# Patient Record
Sex: MALE | Race: WHITE | NOT HISPANIC OR LATINO | Employment: OTHER | URBAN - METROPOLITAN AREA
[De-identification: names, ages, dates, MRNs, and addresses within clinical notes are randomized per-mention and may not be internally consistent; named-entity substitution may affect disease eponyms.]

---

## 2022-04-19 ENCOUNTER — PRE-ADMISSION TESTING (OUTPATIENT)
Dept: ADMISSIONS | Facility: MEDICAL CENTER | Age: 67
End: 2022-04-19
Attending: INTERNAL MEDICINE
Payer: MEDICARE

## 2022-04-19 RX ORDER — FUROSEMIDE 20 MG/1
20 TABLET ORAL 2 TIMES DAILY
COMMUNITY
Start: 2022-01-27 | End: 2022-04-27

## 2022-04-19 RX ORDER — PSEUDOEPHEDRINE HCL 30 MG
30 TABLET ORAL EVERY MORNING
COMMUNITY

## 2022-04-19 RX ORDER — TRAZODONE HYDROCHLORIDE 50 MG/1
50 TABLET ORAL NIGHTLY PRN
COMMUNITY
End: 2023-01-16

## 2022-04-19 RX ORDER — HYDROCHLOROTHIAZIDE 12.5 MG/1
12.5 TABLET ORAL EVERY MORNING
COMMUNITY
Start: 2022-01-27 | End: 2023-02-13 | Stop reason: SDUPTHER

## 2022-04-19 RX ORDER — PROMETHAZINE HYDROCHLORIDE 25 MG/1
25 TABLET ORAL
COMMUNITY
Start: 2022-03-28 | End: 2022-06-25

## 2022-04-19 RX ORDER — MELATONIN 10 MG
CAPSULE ORAL NIGHTLY
COMMUNITY

## 2022-04-19 NOTE — OR NURSING
Pre admit telephone call completed with patient and wife. Based on patient history, he doesn't met criteria for bone marrow biopsy to be done under moderate sedation. Spoke with Lisa,  at [facility], she will send new orders for general anesthesia per guideline.

## 2022-04-22 ENCOUNTER — ANESTHESIA EVENT (OUTPATIENT)
Dept: SURGERY | Facility: MEDICAL CENTER | Age: 67
End: 2022-04-22
Payer: MEDICARE

## 2022-04-22 ENCOUNTER — HOSPITAL ENCOUNTER (OUTPATIENT)
Facility: MEDICAL CENTER | Age: 67
End: 2022-04-22
Attending: INTERNAL MEDICINE | Admitting: INTERNAL MEDICINE
Payer: MEDICARE

## 2022-04-22 ENCOUNTER — ANESTHESIA (OUTPATIENT)
Dept: SURGERY | Facility: MEDICAL CENTER | Age: 67
End: 2022-04-22
Payer: MEDICARE

## 2022-04-22 VITALS
BODY MASS INDEX: 44.83 KG/M2 | RESPIRATION RATE: 17 BRPM | DIASTOLIC BLOOD PRESSURE: 62 MMHG | OXYGEN SATURATION: 98 % | HEART RATE: 80 BPM | SYSTOLIC BLOOD PRESSURE: 111 MMHG | WEIGHT: 243.61 LBS | HEIGHT: 62 IN | TEMPERATURE: 98 F

## 2022-04-22 DIAGNOSIS — D89.2 PARAPROTEINEMIA: ICD-10-CM

## 2022-04-22 LAB
ANION GAP SERPL CALC-SCNC: 9 MMOL/L (ref 7–16)
BASOPHILS # BLD AUTO: 0.5 % (ref 0–1.8)
BASOPHILS # BLD: 0.07 K/UL (ref 0–0.12)
BUN SERPL-MCNC: 23 MG/DL (ref 8–22)
CALCIUM SERPL-MCNC: 9.5 MG/DL (ref 8.5–10.5)
CHLORIDE SERPL-SCNC: 93 MMOL/L (ref 96–112)
CO2 SERPL-SCNC: 30 MMOL/L (ref 20–33)
CREAT SERPL-MCNC: 0.65 MG/DL (ref 0.5–1.4)
EKG IMPRESSION: NORMAL
EOSINOPHIL # BLD AUTO: 0.15 K/UL (ref 0–0.51)
EOSINOPHIL NFR BLD: 1.1 % (ref 0–6.9)
ERYTHROCYTE [DISTWIDTH] IN BLOOD BY AUTOMATED COUNT: 48.2 FL (ref 35.9–50)
GFR SERPLBLD CREATININE-BSD FMLA CKD-EPI: 102 ML/MIN/1.73 M 2
GLUCOSE SERPL-MCNC: 119 MG/DL (ref 65–99)
HCT VFR BLD AUTO: 41.6 % (ref 42–52)
HGB BLD-MCNC: 13.3 G/DL (ref 14–18)
HGB RETIC QN AUTO: 31.2 PG/CELL (ref 29–35)
IMM GRANULOCYTES # BLD AUTO: 0.1 K/UL (ref 0–0.11)
IMM GRANULOCYTES NFR BLD AUTO: 0.7 % (ref 0–0.9)
IMM RETICS NFR: 19.2 % (ref 9.3–17.4)
LYMPHOCYTES # BLD AUTO: 2.62 K/UL (ref 1–4.8)
LYMPHOCYTES NFR BLD: 19.2 % (ref 22–41)
MCH RBC QN AUTO: 26.7 PG (ref 27–33)
MCHC RBC AUTO-ENTMCNC: 32 G/DL (ref 33.7–35.3)
MCV RBC AUTO: 83.5 FL (ref 81.4–97.8)
MONOCYTES # BLD AUTO: 1.22 K/UL (ref 0–0.85)
MONOCYTES NFR BLD AUTO: 9 % (ref 0–13.4)
NEUTROPHILS # BLD AUTO: 9.46 K/UL (ref 1.82–7.42)
NEUTROPHILS NFR BLD: 69.5 % (ref 44–72)
NRBC # BLD AUTO: 0 K/UL
NRBC BLD-RTO: 0 /100 WBC
PATHOLOGY CONSULT NOTE: NORMAL
PLATELET # BLD AUTO: 333 K/UL (ref 164–446)
PMV BLD AUTO: 8.6 FL (ref 9–12.9)
POTASSIUM SERPL-SCNC: 4.2 MMOL/L (ref 3.6–5.5)
RBC # BLD AUTO: 4.98 M/UL (ref 4.7–6.1)
RETICS # AUTO: 0.07 M/UL (ref 0.04–0.06)
RETICS/RBC NFR: 1.5 % (ref 0.8–2.1)
SODIUM SERPL-SCNC: 132 MMOL/L (ref 135–145)
WBC # BLD AUTO: 13.6 K/UL (ref 4.8–10.8)

## 2022-04-22 PROCEDURE — 93005 ELECTROCARDIOGRAM TRACING: CPT | Performed by: INTERNAL MEDICINE

## 2022-04-22 PROCEDURE — 80048 BASIC METABOLIC PNL TOTAL CA: CPT

## 2022-04-22 PROCEDURE — 160035 HCHG PACU - 1ST 60 MINS PHASE I: Performed by: HOSPITALIST

## 2022-04-22 PROCEDURE — 88237 TISSUE CULTURE BONE MARROW: CPT | Mod: 91

## 2022-04-22 PROCEDURE — 88305 TISSUE EXAM BY PATHOLOGIST: CPT | Mod: 59

## 2022-04-22 PROCEDURE — 88374 M/PHMTRC ALYS ISHQUANT/SEMIQ: CPT | Mod: 91

## 2022-04-22 PROCEDURE — 85025 COMPLETE CBC W/AUTO DIFF WBC: CPT

## 2022-04-22 PROCEDURE — 88311 DECALCIFY TISSUE: CPT

## 2022-04-22 PROCEDURE — 700105 HCHG RX REV CODE 258: Performed by: INTERNAL MEDICINE

## 2022-04-22 PROCEDURE — 160025 RECOVERY II MINUTES (STATS): Performed by: HOSPITALIST

## 2022-04-22 PROCEDURE — 88184 FLOWCYTOMETRY/ TC 1 MARKER: CPT

## 2022-04-22 PROCEDURE — 93010 ELECTROCARDIOGRAM REPORT: CPT | Performed by: INTERNAL MEDICINE

## 2022-04-22 PROCEDURE — 85046 RETICYTE/HGB CONCENTRATE: CPT

## 2022-04-22 PROCEDURE — 88264 CHROMOSOME ANALYSIS 20-25: CPT

## 2022-04-22 PROCEDURE — 88185 FLOWCYTOMETRY/TC ADD-ON: CPT | Mod: 91

## 2022-04-22 PROCEDURE — 88342 IMHCHEM/IMCYTCHM 1ST ANTB: CPT

## 2022-04-22 PROCEDURE — 160002 HCHG RECOVERY MINUTES (STAT): Performed by: HOSPITALIST

## 2022-04-22 PROCEDURE — 160046 HCHG PACU - 1ST 60 MINS PHASE II: Performed by: HOSPITALIST

## 2022-04-22 PROCEDURE — 01112 ANES BONE MARROW ASPIR&/BX: CPT | Performed by: ANESTHESIOLOGY

## 2022-04-22 PROCEDURE — 160048 HCHG OR STATISTICAL LEVEL 1-5: Performed by: HOSPITALIST

## 2022-04-22 PROCEDURE — 36415 COLL VENOUS BLD VENIPUNCTURE: CPT

## 2022-04-22 PROCEDURE — 38222 DX BONE MARROW BX & ASPIR: CPT | Performed by: HOSPITALIST

## 2022-04-22 PROCEDURE — 88360 TUMOR IMMUNOHISTOCHEM/MANUAL: CPT

## 2022-04-22 PROCEDURE — 700101 HCHG RX REV CODE 250: Performed by: ANESTHESIOLOGY

## 2022-04-22 PROCEDURE — 88341 IMHCHEM/IMCYTCHM EA ADD ANTB: CPT | Mod: 91

## 2022-04-22 PROCEDURE — 700111 HCHG RX REV CODE 636 W/ 250 OVERRIDE (IP): Performed by: ANESTHESIOLOGY

## 2022-04-22 PROCEDURE — 88313 SPECIAL STAINS GROUP 2: CPT

## 2022-04-22 PROCEDURE — 160009 HCHG ANES TIME/MIN: Performed by: HOSPITALIST

## 2022-04-22 PROCEDURE — 160027 HCHG SURGERY MINUTES - 1ST 30 MINS LEVEL 2: Performed by: HOSPITALIST

## 2022-04-22 RX ORDER — KETAMINE HYDROCHLORIDE 50 MG/ML
INJECTION, SOLUTION INTRAMUSCULAR; INTRAVENOUS PRN
Status: DISCONTINUED | OUTPATIENT
Start: 2022-04-22 | End: 2022-04-22 | Stop reason: SURG

## 2022-04-22 RX ORDER — SODIUM CHLORIDE, SODIUM LACTATE, POTASSIUM CHLORIDE, CALCIUM CHLORIDE 600; 310; 30; 20 MG/100ML; MG/100ML; MG/100ML; MG/100ML
INJECTION, SOLUTION INTRAVENOUS CONTINUOUS
Status: DISCONTINUED | OUTPATIENT
Start: 2022-04-22 | End: 2022-04-22 | Stop reason: HOSPADM

## 2022-04-22 RX ORDER — MIDAZOLAM HYDROCHLORIDE 1 MG/ML
INJECTION INTRAMUSCULAR; INTRAVENOUS PRN
Status: DISCONTINUED | OUTPATIENT
Start: 2022-04-22 | End: 2022-04-22 | Stop reason: SURG

## 2022-04-22 RX ORDER — DIPHENHYDRAMINE HYDROCHLORIDE 50 MG/ML
12.5 INJECTION INTRAMUSCULAR; INTRAVENOUS
Status: DISCONTINUED | OUTPATIENT
Start: 2022-04-22 | End: 2022-04-22 | Stop reason: HOSPADM

## 2022-04-22 RX ORDER — ONDANSETRON 2 MG/ML
4 INJECTION INTRAMUSCULAR; INTRAVENOUS
Status: DISCONTINUED | OUTPATIENT
Start: 2022-04-22 | End: 2022-04-22 | Stop reason: HOSPADM

## 2022-04-22 RX ORDER — HALOPERIDOL 5 MG/ML
1 INJECTION INTRAMUSCULAR
Status: DISCONTINUED | OUTPATIENT
Start: 2022-04-22 | End: 2022-04-22 | Stop reason: HOSPADM

## 2022-04-22 RX ADMIN — KETAMINE HYDROCHLORIDE 20 MG: 50 INJECTION INTRAMUSCULAR; INTRAVENOUS at 11:05

## 2022-04-22 RX ADMIN — KETAMINE HYDROCHLORIDE 20 MG: 50 INJECTION INTRAMUSCULAR; INTRAVENOUS at 11:16

## 2022-04-22 RX ADMIN — KETAMINE HYDROCHLORIDE 10 MG: 50 INJECTION INTRAMUSCULAR; INTRAVENOUS at 11:12

## 2022-04-22 RX ADMIN — MIDAZOLAM HYDROCHLORIDE 2 MG: 1 INJECTION, SOLUTION INTRAMUSCULAR; INTRAVENOUS at 11:05

## 2022-04-22 RX ADMIN — SODIUM CHLORIDE, POTASSIUM CHLORIDE, SODIUM LACTATE AND CALCIUM CHLORIDE: 600; 310; 30; 20 INJECTION, SOLUTION INTRAVENOUS at 10:59

## 2022-04-22 RX ADMIN — KETAMINE HYDROCHLORIDE 20 MG: 50 INJECTION INTRAMUSCULAR; INTRAVENOUS at 11:08

## 2022-04-22 ASSESSMENT — FIBROSIS 4 INDEX: FIB4 SCORE: 0.57

## 2022-04-22 ASSESSMENT — PAIN DESCRIPTION - PAIN TYPE: TYPE: SURGICAL PAIN

## 2022-04-22 NOTE — ANESTHESIA TIME REPORT
Anesthesia Start and Stop Event Times     Date Time Event    4/22/2022 1050 Ready for Procedure     1059 Anesthesia Start     1126 Anesthesia Stop        Responsible Staff  04/22/22    Name Role Begin End    Austyn Azul M.D. Anesth 1059 1126        Overtime Reason:  no overtime (within assigned shift)    Comments:

## 2022-04-22 NOTE — PROCEDURES
Bone Marrow Biopsy/Aspiration    Date/Time: 4/22/2022 12:04 PM  Performed by: Floyd Javier M.D.  Authorized by: Floyd Javier M.D.     Consent:     Consent obtained:  Verbal    Consent given by:  Patient    Risks discussed:  Bleeding, infection, pain and repeat procedure    Alternatives discussed:  No treatment, delayed treatment and alternative treatment  Pre-procedure details:     Procedure type:  Aspiration and biopsy    Requesting physician:  Sujey    Indications:  Multiple myeloma    Position:  Left lateral decubitus    Buttock laterality:  Right and left    Local anesthetic:  1% Lidocaine    Subcutaneous volume:  1 mL    Periosteum anesthetic volume:  8 mL    Preparation: Patient was prepped and draped in usual sterile fashion    Sedation:     Patient Sedated: No    Procedure details:     Aspirate obtained:  5 mL followed by 5 mL    Biopsy performed:  1 core    Number of attempts:  1  Post-procedure:     Puncture site:  Adhesive bandage applied    Patient tolerance of procedure:  Tolerated well, no immediate complications  Comments:      Anesthesia provided by Dr. Azul  Patient with left sided pain pump, he was positioned in left lateral decubitus position and right hip was biopsied  Spinal needle required to anesthesize periosteum  Heparinized aspirate on was obtained after core

## 2022-04-22 NOTE — ANESTHESIA POSTPROCEDURE EVALUATION
Patient: Kenneth Wasserman    Procedure Summary     Date: 04/22/22 Room / Location: [facility] ROOM 26 / SURGERY SAME DAY [facility]    Anesthesia Start: 1059 Anesthesia Stop: 1126    Procedures:       ASPIRATION, BONE MARROW (Right Hip)      BIOPSY, BONE MARROW, USING NEEDLE OR TROCAR - DR. WHATLEY (Right Hip) Diagnosis: (NONOCLONAL GAMMOPATHY)    Surgeons: Floyd Javier M.D. Responsible Provider: Austyn Azul M.D.    Anesthesia Type: MAC ASA Status: 3          Final Anesthesia Type: MAC  Last vitals  BP   Blood Pressure : 148/69    Temp   36.3 °C (97.4 °F)    Pulse   96   Resp   18    SpO2   96 %      Anesthesia Post Evaluation    Patient location during evaluation: PACU  Patient participation: complete - patient participated  Level of consciousness: awake and alert    Airway patency: patent  Anesthetic complications: no  Cardiovascular status: hemodynamically stable  Respiratory status: acceptable  Hydration status: euvolemic    PONV: none          No complications documented.     Nurse Pain Score: 0 (NPRS)

## 2022-04-22 NOTE — ANESTHESIA PREPROCEDURE EVALUATION
Case: [id number] Date/Time: 04/22/22 1115    Procedures:       ASPIRATION, BONE MARROW      BIOPSY, BONE MARROW, USING NEEDLE OR TROCAR - DR. WHATLEY    Pre-op diagnosis: NONOCLONAL GAMMOPATHY    Location: [facility] ROOM 26 / SURGERY SAME DAY [facility]    Surgeons: Floyd Javier M.D.          Relevant Problems   PULMONARY   (positive) COPD (chronic obstructive pulmonary disease) (HCC)      CARDIAC   (positive) Hypertension      GI   (positive) GERD (gastroesophageal reflux disease)      Other   (positive) BMI 40.0-44.9, adult (HCC)   (positive) Chronic pain   (positive) Elevated fasting glucose   (positive) Marijuana smoker   (positive) Narcotic dependence ([facility])   (positive) Snoring       Physical Exam    Airway   Mallampati: III  TM distance: >3 FB  Neck ROM: full       Cardiovascular - normal exam  Rhythm: regular  Rate: normal  (-) murmur     Dental - normal exam           Pulmonary - normal exam  Breath sounds clear to auscultation     Abdominal    Neurological - normal exam                 Anesthesia Plan    ASA 3   ASA physical status 3 criteria: morbid obesity - BMI greater than or equal to 40    Plan - MAC               Induction: intravenous          Informed Consent:    Anesthetic plan and risks discussed with patient.

## 2022-04-22 NOTE — OR NURSING
1123- Pt arrived to PACU, report received.  Pt on 2L NC.  R hip with bandaid on that's CDI.      1130- Phase 2 met at this time. Pt tolerating sips of water.     1205-Pt getting dressed.      1212-IV d.c with tip intact.  Discharge instructions discussed with patient and his wife.  All questions answered at this time.  Pt d/c home via wheelchair with all belongings.

## 2022-11-08 ENCOUNTER — PATIENT MESSAGE (OUTPATIENT)
Dept: HEALTH INFORMATION MANAGEMENT | Facility: OTHER | Age: 67
End: 2022-11-08

## 2023-01-16 PROBLEM — M96.1 POSTLAMINECTOMY SYNDROME, NOT ELSEWHERE CLASSIFIED: Status: ACTIVE | Noted: 2018-10-18

## 2023-01-16 PROBLEM — M81.0 AGE-RELATED OSTEOPOROSIS WITHOUT CURRENT PATHOLOGICAL FRACTURE: Status: ACTIVE | Noted: 2021-07-06

## 2023-01-16 PROBLEM — F41.9 ANXIETY: Status: ACTIVE | Noted: 2023-01-16

## 2023-01-16 PROBLEM — K57.32 DIVERTICULITIS OF COLON: Status: ACTIVE | Noted: 2020-12-01

## 2023-01-16 PROBLEM — F32.A DEPRESSION: Status: ACTIVE | Noted: 2020-12-01

## 2023-01-16 PROBLEM — E29.1 HYPOGONADISM IN MALE: Status: ACTIVE | Noted: 2020-12-01

## 2023-01-16 PROBLEM — E24.0 CUSHING DISEASE (HCC): Status: ACTIVE | Noted: 2021-11-15

## 2023-06-12 PROBLEM — M19.90 OSTEOARTHROSIS: Status: ACTIVE | Noted: 2023-06-12

## 2023-06-12 PROBLEM — M51.36 OTHER INTERVERTEBRAL DISC DEGENERATION, LUMBAR REGION: Status: ACTIVE | Noted: 2018-10-18

## 2023-06-12 PROBLEM — R79.89 LOW TESTOSTERONE LEVEL IN MALE: Status: ACTIVE | Noted: 2023-06-12

## 2023-06-12 PROBLEM — G47.00 INSOMNIA: Status: ACTIVE | Noted: 2020-12-01

## 2023-06-12 PROBLEM — N40.1 BENIGN PROSTATIC HYPERPLASIA WITH LOWER URINARY TRACT SYMPTOMS: Status: ACTIVE | Noted: 2020-12-01

## 2023-06-12 PROBLEM — C88.0 WALDENSTROMS MACROGLOBULINEMIA (HCC): Status: ACTIVE | Noted: 2022-06-08

## 2023-06-12 PROBLEM — M43.26 FUSION OF SPINE, LUMBAR REGION: Status: ACTIVE | Noted: 2018-10-18

## 2024-01-03 NOTE — DISCHARGE INSTRUCTIONS
BONE MARROW ASPIRATION & BIOPSY DISCHARGE INSTRUCTIONS    1. After the numbing medicine wears off, you may feel some discomfort.    2. Keep bandage clean and dry for 24 hours.  After this time, you can change the bandage.  You may now bathe or shower.    3. If bleeding occurs after your bone marrow aspiration or biopsy, apply pressure to the area and call your doctor immediately.    4. Call your doctor if pain persists for greater than 24 hours in the area where you had your aspiration or biopsy.    5. Call your doctor immediately if you notice redness or drainage in the area or if you have a fever.    6. Call your doctor if you have numbness or weakness in the area where the doctor took the bone marrow or down your leg.    7. Do not drive or drink alcohol for 24 hours if you have had sedation medication.  The medication will make you drowsy.    8. Resume your regular diet.    9. Follow up with your doctor.      I acknowledge receipt and understanding of these Home Care Instructions.       The patient location is: [place]  The chief complaint leading to consultation is: fatty liver     Visit type: audiovisual    Face to Face time with patient: 30 minutes of total time spent on the encounter, which includes face to face time and non-face to face time preparing to see the patient (eg, review of tests), Obtaining and/or reviewing separately obtained history, Documenting clinical information in the electronic or other health record, Independently interpreting results (not separately reported) and communicating results to the patient/family/caregiver, or Care coordination (not separately reported).     Each patient to whom he or she provides medical services by telemedicine is:  (1) informed of the relationship between the physician and patient and the respective role of any other health care provider with respect to management of the patient; and (2) notified that he or she may decline to receive medical services by telemedicine and may withdraw from such care at any time.    Notes:     [facility] Established Patient Visit    Reason for Visit:  Fatty liver    PCP: Dae Romo    HPI:  This is a 67 y.o. male with PMH noted below, here for follow up of above     Previous heavy alcohol use (2-3 beers and 2-3 glasses of wine daily x 40 years). Stopped alcohol end of August 2019 before scheduled knee surgery, but resumed heavy/daily alcohol use in 2020     Serological workup was negative for Daniel's, alpha-1 antitrypsin deficiency, hemochromatosis, autoimmune etiology, and viral hepatitis C.      Risk factors for fatty liver include alcohol use, morbid obesity, HTN, HLD     Liver fibrosis staging:  -- Fibroscan 10/2019 F0-1 (kPA 7.1), S3 steatosis  -- fibroscan 5/2021 noted F4 (kPA 19.7), S3 ()  -- MRI elasto 10/2021 noted no fibrosis. No steatosis staging because scan was ended early due to back pain   -- MRI elasto 12/2023 noted severe steatosis, no fibrosis, no iron  (29.3%), kpa  2.1        Interval HPI: Presents today with wife via video visit. Drinking beer or wine most days of the week (usually 2 servings, sometimes more)  Current wt 287 lbs   Intermittent Fried/fast/takeout foods  No SSB  MRI elasto without any fibrosis but severe/worsening steatosis    Labs done 12/2023 show near normal transaminase levels (ALT > AST, intermittently elevated since 2016 )  Platelets and alk phos WNL  Synthetic liver functioning WNL    Lab Results   Component Value Date    ALT 41 12/01/2023    AST 29 12/01/2023    ALKPHOS 58 12/01/2023    BILITOT 0.7 12/01/2023    ALBUMIN 4.4 12/01/2023    INR 0.9 10/19/2021     10/19/2021     MRI elasto done 12/2023 noted fatty liver     Denies family history of liver disease. + daily Alcohol consumption  Social History     Substance and Sexual Activity   Alcohol Use Not Currently    Alcohol/week: 14.0 standard drinks of alcohol    Types: 14 Cans of beer per week    Comment: ~14 servings per week, most days wine or beer     Immunity to Hep A and B - completed Hep B vaccine series         PMHX:  has a past medical history of Actinic keratosis, Arthritis, BPH (benign prostatic hyperplasia), Cataract, Coronary artery disease, COVID-19 vaccine administered, Diabetic retinopathy of both eyes, Fatty liver, Heart attack, Hyperlipidemia, Hypertension, Inguinal hernia, Kidney stone, Morbid obesity with BMI of 40.0-44.9, adult (10/02/2019), Neuropathy, Obesity (BMI 30-39.9) (10/02/2019), GEOVANNA on CPAP, Personal history of colonic polyps, Renal calculi, Subclinical hypothyroidism, and Umbilical hernia.    PSHX:  has a past surgical history that includes Knee surgery; Joint replacement (Left, 2003); Percutaneous cryotherapy of peripheral nerve using liquid nitrous oxide in closed needle device (Right, 08/26/2019); Spine surgery; Total knee arthroplasty (Right, 09/05/2019); right knee replacement ; Left heart catheterization (Left, 11/27/2019); Posterior fusion of cervical spine  with laminectomy (N/A, 07/28/2020); Back surgery; and Colonoscopy (N/A, 8/9/2023).    The patient's social and family histories were reviewed by me and updated in the appropriate section of the electronic medical record.    Review of patient's allergies indicates:   Allergen Reactions    Ace inhibitors Other (See Comments)     cough       Current Outpatient Medications on File Prior to Visit   Medication Sig Dispense Refill    albuterol (PROVENTIL/VENTOLIN HFA) 90 mcg/actuation inhaler Inhale 2 puffs into the lungs every 6 (six) hours as needed for Wheezing or Shortness of Breath. Rescue 18 g 0    allopurinoL (ZYLOPRIM) 300 MG tablet TAKE 1 TABLET BY MOUTH EVERY DAY 90 tablet 1    alpha lipoic acid 600 mg Cap TAKE 1 CAPSULE BY MOUTH ONCE DAILY 90 each 3    aspirin (ECOTRIN) 81 MG EC tablet TAKE 1 TABLET BY MOUTH EVERY DAY 32 tablet 0    carvediloL (COREG) 6.25 MG tablet Take 1 tablet (6.25 mg total) by mouth 2 (two) times daily. 60 tablet 0    cyclobenzaprine (FLEXERIL) 10 MG tablet TAKE 1 TABLET BY MOUTH THREE TIMES A DAY AS NEEDED FOR MUSCLE SPASMS 90 tablet 1    diazePAM (VALIUM) 10 MG Tab Take one tab 6 hours before test  then take 30min prior to test 5 tablet 0    finasteride (PROSCAR) 5 mg tablet TAKE 1 TABLET BY MOUTH EVERY DAY 90 tablet 1    fluticasone propionate (FLONASE) 50 mcg/actuation nasal spray SPRAY 1 SPRAY INTO EACH NOSTRIL EVERY DAY 48 mL 1    loratadine (CLARITIN) 10 mg tablet Take 1 tablet by mouth once daily. Take if needed      multivitamin capsule Take 1 capsule by mouth once daily.       omega-3 fatty acids/fish oil (FISH OIL-OMEGA-3 FATTY ACIDS) 300-1,000 mg capsule Take 1 capsule by mouth once daily.       ONETOUCH DELICA PLUS LANCET 30 gauge Misc USE 1 LANCETS TO TEST BLOOD GLUCOSE ONCE DAILY 100 each 3    ONETOUCH VERIO TEST STRIPS Strp TEST BLOOD LEVEL DAILY 100 strip 3    rosuvastatin (CRESTOR) 40 MG Tab Take 1 tablet by mouth Daily.      tamsulosin (FLOMAX) 0.4 mg Cap Take 1 capsule  (0.4 mg total) by mouth once daily. 90 capsule 1    telmisartan-hydrochlorothiazide (MICARDIS HCT) 80-25 mg per tablet Take 1 tablet by mouth once daily. 90 tablet 3    traMADoL (ULTRAM) 50 mg tablet Take 1 tablet (50 mg total) by mouth every 8 (eight) hours as needed for Pain. (Patient not taking: Reported on 11/14/2023) 21 tablet 0    zafirlukast (ACCOLATE) 20 MG tablet TAKE 1 TABLET BY MOUTH TWICE A  tablet 3     Current Facility-Administered Medications on File Prior to Visit   Medication Dose Route Frequency Provider Last Rate Last Admin    0.9%  NaCl infusion   Intravenous Continuous Randell Shoemaker MD   Stopped at 08/09/23 1056    sodium chloride 0.9% flush 10 mL  10 mL Intravenous PRN Jade Mora MD           SOCIAL HISTORY:   Social History     Substance and Sexual Activity   Alcohol Use Not Currently    Alcohol/week: 14.0 standard drinks of alcohol    Types: 14 Cans of beer per week    Comment: ~14 servings per week, most days wine or beer       Social History     Substance and Sexual Activity   Drug Use Never       ROS:   GENERAL: Denies fatigue  CARDIOVASCULAR: Denies edema  GI: Denies abdominal pain  SKIN: Denies rash, itching   NEURO: Denies confusion, memory loss, or mood changes    Objective Findings:    PHYSICAL EXAM:   Friendly White male, in no acute distress; alert and oriented to person, place and time  VITALS: There were no vitals taken for this visit.  EYES: Sclerae anicteric  GI: Soft, non-tender, non-distended. No ascites.  EXTREMITIES:  No edema.  SKIN: Warm and dry. No jaundice. No telangectasias noted. No palmar erythema.  NEURO:  No asterixis.  PSYCH:  Thought and speech pattern appropriate. Behavior normal        EDUCATION:  See instructions discussed with patient in Instructions section of the After Visit Summary       ASSESSMENT & PLAN:  67 y.o. White male with:  1.   Fatty liver, likely previous alcohol + metabolic risk factors  -- Risk factors for fatty liver  include  alcohol use, morbid obesity, HTN, HLD, h/o preDM  -- MRI elasto done 12/2023 noted fatty liver   - ALT > AST, intermittently elevated since 2016  - Synthetic liver function WNL  -- MRI elasto 12/2023 noted no fibrosis, severe steatosis - will repeat yearly      2. Previously elevated liver enzymes  -- Serological workup in HPI     3. Morbid Obesity, HTN, HLD  -- would benefit from GLP-1, as needs to lose ~50 lbs from a fatty liver perspective. Message sent to endocrine to inquire candidacy   -- Body mass index is 43.7 kg/m².   -- increases risk for fatty liver  -- recommendations to pt:  1. Weight loss goal of 50 lbs  2. Low carb/sugar, high fiber and protein diet  3. Exercise, 5 days per week, 30 minutes per day, as tolerated  4. Recommend good cholesterol, blood pressure, blood sugar levels     4. Alcohol use  -- recommend to decrease by 50%. May want/need to avoid completely if uses GLP-1 for weight loss, discussed with pt     Social History     Substance and Sexual Activity   Alcohol Use Not Currently    Alcohol/week: 14.0 standard drinks of alcohol    Types: 14 Cans of beer per week    Comment: ~14 servings per week, most days wine or beer         Follow up in about 1 year (around 1/3/2025). with MRI elasto and labs 1 week before  Orders Placed This Encounter   Procedures    MR Elastography    Hepatic Function Panel    Phosphatidylethanol (PETH)        Thank you for allowing me to participate in the care of ALLY Gross    I spent a total of 30 minutes on the day of the visit.This includes face to face time and non-face to face time preparing to see the patient (eg, review of tests), obtaining and/or reviewing separately obtained history, documenting clinical information in the electronic or other health record, independently interpreting results and communicating results to the patient/family/caregiver, and coordinating care.       CC'ed note to:   Dr. Igor deras

## (undated) DEVICE — CUSHION EAR E-Z WRAP NASAL CANNULA - (25/CA)

## (undated) DEVICE — TOWEL STOP TIMEOUT SAFETY FLAG (40EA/CA)

## (undated) DEVICE — BANDAID SHEER STRIP 3/4 IN (100EA/BX 12BX/CA)

## (undated) DEVICE — ELECTRODE 850 FOAM ADHESIVE - HYDROGEL RADIOTRNSPRNT (50/PK)

## (undated) DEVICE — SET LEADWIRE 5 LEAD BEDSIDE DISPOSABLE ECG (1SET OF 5/EA)

## (undated) DEVICE — KIT  I.V. START (100EA/CA)